# Patient Record
Sex: FEMALE
[De-identification: names, ages, dates, MRNs, and addresses within clinical notes are randomized per-mention and may not be internally consistent; named-entity substitution may affect disease eponyms.]

---

## 2019-04-04 ENCOUNTER — HOSPITAL ENCOUNTER (OUTPATIENT)
Dept: HOSPITAL 31 - C.3T | Age: 74
Setting detail: OBSERVATION
LOS: 1 days | Discharge: HOME | End: 2019-04-05
Attending: OTOLARYNGOLOGY | Admitting: OTOLARYNGOLOGY
Payer: MEDICARE

## 2019-04-04 VITALS — RESPIRATION RATE: 20 BRPM

## 2019-04-04 DIAGNOSIS — I10: ICD-10-CM

## 2019-04-04 DIAGNOSIS — T17.208A: Primary | ICD-10-CM

## 2019-04-04 DIAGNOSIS — E78.5: ICD-10-CM

## 2019-04-04 DIAGNOSIS — E78.00: ICD-10-CM

## 2019-04-04 LAB
ALBUMIN SERPL-MCNC: 4.1 G/DL (ref 3.5–5)
ALBUMIN/GLOB SERPL: 1.7 {RATIO} (ref 1–2.1)
ALT SERPL-CCNC: 39 U/L (ref 9–52)
APTT BLD: 30 SECONDS (ref 21–34)
AST SERPL-CCNC: 46 U/L (ref 14–36)
BASOPHILS # BLD AUTO: 0 K/UL (ref 0–0.2)
BASOPHILS NFR BLD: 0.3 % (ref 0–2)
BUN SERPL-MCNC: 17 MG/DL (ref 7–17)
CALCIUM SERPL-MCNC: 9.3 MG/DL (ref 8.6–10.4)
EOSINOPHIL # BLD AUTO: 0 K/UL (ref 0–0.7)
EOSINOPHIL NFR BLD: 0.7 % (ref 0–4)
ERYTHROCYTE [DISTWIDTH] IN BLOOD BY AUTOMATED COUNT: 12.7 % (ref 11.5–14.5)
GFR NON-AFRICAN AMERICAN: > 60
HGB BLD-MCNC: 13.1 G/DL (ref 11–16)
INR PPP: 1.1
LYMPHOCYTES # BLD AUTO: 1.3 K/UL (ref 1–4.3)
LYMPHOCYTES NFR BLD AUTO: 27 % (ref 20–40)
MCH RBC QN AUTO: 33.4 PG (ref 27–31)
MCHC RBC AUTO-ENTMCNC: 33.2 G/DL (ref 33–37)
MCV RBC AUTO: 100.9 FL (ref 81–99)
MONOCYTES # BLD: 0.4 K/UL (ref 0–0.8)
MONOCYTES NFR BLD: 8.5 % (ref 0–10)
NEUTROPHILS # BLD: 3.1 K/UL (ref 1.8–7)
NEUTROPHILS NFR BLD AUTO: 63.5 % (ref 50–75)
NRBC BLD AUTO-RTO: 0.1 % (ref 0–2)
PLATELET # BLD: 171 K/UL (ref 130–400)
PMV BLD AUTO: 8.7 FL (ref 7.2–11.7)
PROTHROMBIN TIME: 11.8 SECONDS (ref 9.7–12.2)
RBC # BLD AUTO: 3.91 MIL/UL (ref 3.8–5.2)
WBC # BLD AUTO: 4.8 K/UL (ref 4.8–10.8)

## 2019-04-04 PROCEDURE — 71045 X-RAY EXAM CHEST 1 VIEW: CPT

## 2019-04-04 PROCEDURE — 36415 COLL VENOUS BLD VENIPUNCTURE: CPT

## 2019-04-04 PROCEDURE — 80053 COMPREHEN METABOLIC PANEL: CPT

## 2019-04-04 PROCEDURE — 86850 RBC ANTIBODY SCREEN: CPT

## 2019-04-04 PROCEDURE — 99285 EMERGENCY DEPT VISIT HI MDM: CPT

## 2019-04-04 PROCEDURE — 85610 PROTHROMBIN TIME: CPT

## 2019-04-04 PROCEDURE — 85025 COMPLETE CBC W/AUTO DIFF WBC: CPT

## 2019-04-04 PROCEDURE — 86900 BLOOD TYPING SEROLOGIC ABO: CPT

## 2019-04-04 PROCEDURE — 85730 THROMBOPLASTIN TIME PARTIAL: CPT

## 2019-04-04 PROCEDURE — 70490 CT SOFT TISSUE NECK W/O DYE: CPT

## 2019-04-04 PROCEDURE — 31530 LARYNGOSCOPY W/FB REMOVAL: CPT

## 2019-04-04 NOTE — C.PDOC
History Of Present Illness





Patient presents to ED c/o foreign body sensation in her throat since eating 

fish at 2pm today.  She thinks she may have a fish bone stuck in her throat.  

She denies SOB, cough, chest pain, fever.





PMHx: HTN, hyperlipidemia, gastritis, arthritis


Time Seen by Provider: 04/04/19 16:24


Chief Complaint (Nursing): ENT Problem


History Per: Patient


History/Exam Limitations: no limitations


Current Symptoms Are (Timing): Still Present


Quality Of Discomfort: "Pain" (foreign body sensation)


Severity: Moderate





Past Medical History


Reviewed: Historical Data, Nursing Documentation, Vital Signs


Vital Signs: 





                                Last Vital Signs











Temp  98.3 F   04/04/19 16:13


 


Pulse  76   04/04/19 16:13


 


Resp  16   04/04/19 16:13


 


BP  110/53 L  04/04/19 16:13


 


Pulse Ox  97   04/04/19 16:13














- Medical History


PMH: Arthritis, Gastritis, HTN, Hypercholesterolemia


Surgical History: Endoscopy





- [facility] Procedures











CYSTOSCOPY NEC (08/12/15)


TRANSURETH BLADD BIOPSY (08/12/15)








Family History: States: No Known Family Hx





- Social History


Hx Alcohol Use: No


Hx Substance Use: No





- Immunization History


Hx Tetanus Toxoid Vaccination: No


Hx Influenza Vaccination: No





Review Of Systems


Constitutional: Negative for: Fever, Chills


ENT: Positive for: Throat Pain (FB sensation).  Negative for: Nose Congestion


Cardiovascular: Negative for: Chest Pain, Palpitations


Respiratory: Negative for: Cough, Shortness of Breath


Gastrointestinal: Negative for: Nausea, Vomiting, Abdominal Pain


Skin: Negative for: Rash





Physical Exam





- Physical Exam


Appears: Well, Non-toxic, No Acute Distress, Other (speaking in full sentences)


Head: Normacephalic


Eye(s): bilateral: Normal Inspection


Nose: Normal


Oral Mucosa: Moist


Tongue: Normal Appearing, No Swelling


Lips: Normal Appearing, No Swelling


Throat: Normal, No Erythema, No Exudate, No Drooling, Other (uvula midline and 

normal in appearance )


Lymphatic: No Adenopathy (no cervical adenopathy)


Chest: Symmetrical, No Subcutaneous Emphysema


Cardiovascular: Rhythm Regular


Respiratory: Normal Breath Sounds, No Rales, No Rhonchi, No Wheezing


Gastrointestinal/Abdominal: Normal Exam, Bowel Sounds, Soft, No Tenderness


Neurological/Psych: Oriented x3





ED Course And Treatment


O2 Sat by Pulse Oximetry: 97 (ra)


Pulse Ox Interpretation: Normal





- Radiology


CXR: Interpreted by Me, Viewed By Me


CXR Interpretation: Yes: No Acute Disease.  No: Infiltrates





- CT Scan/US


  ** CT SOFT TISSUE NECK 


Other Rad Studies (CT/US): Read By Radiologist, Radiology Report Reviewed


CT/US Interpretation: Accession No. : [id number].  Patient Name / ID : 

DAMON PICKENS  / [id number].  Exam Date : 04/04/2019 17:27:48 ( Approved ).  

Study Comment :  Sex / Age : F  / 73Y.  Creator : Thuy Moncada.  Dictator : 

Edita Huang MD.   :  Approver : Edita Huang MD.  

Approver2 :  Report Date : 04/04/2019 17:33:47.  My Comment :  

*******************

****************************************************************.  Date of 

service:  04/04/2019.  PROCEDURE:  CT NECK WITHOUT  CONTRAST.  HISTORY:  fish 

bone/FB stuck in throat.  COMPARISON:  None available.  TECHNIQUE:  CT of the 

neck without intravenous contrast. Coronal and sagittal reformats generated.  

Radiation dose:  Total exam DLP = 234.72 mGy-cm.  This CT exam was performed 

using one or more of the following dose reduction techniques: Automated exposure

control, adjustment of the mA and/or kV according to patient size, and/or use of

iterative reconstruction technique.  FINDINGS:  NASOPHARYNX:  Within normal 

limits.  SUPRAHYOID NECK:  There is a long linear at least 3.5 cm radiopaque 

foreign body in the right piriform sinus extending into the right parapharyngeal

space.  The oropharynx, oral cavity, parapharyngeal space and retropharyngeal 

space are grossly normal.  INFRAHYOID NECK:  Unremarkable larynx, hypopharynx, 

and supraglottic space. Vocal cords intact.  MASS:  None.  GLANDS:  Parotid and 

submandibular glands unremarkable. Normal size thyroid gland, without nodule.  

LYMPH NODES:  Normal. No lymphadenopathy.  CERVICAL SPINE:  No fracture or focal

lesion. Mild multilevel degenerative disc disease.  OTHER FINDINGS:  None.  

IMPRESSION:  Findings are consistent with at least 3.5 cm fish bone in the right

piriform sinus extending into the right parapharyngeal space.


Progress Note: Blood work, CT soft tissue neck ordered and reviewed.  6:20pm- 

Call placed to Dr. Behin, mailbox full.  6:35pm- Spoke with Dr. Behin, will take

patient to OR tonight.





Disposition





- Disposition

## 2019-04-04 NOTE — CT
Date of service: 



04/04/2019



PROCEDURE:  CT NECK WITHOUT  CONTRAST



HISTORY:

fish bone/FB stuck in throat



COMPARISON:

None available.



TECHNIQUE:

CT of the neck without intravenous contrast. Coronal and sagittal 

reformats generated. 



Radiation dose:



Total exam DLP = 234.72 mGy-cm.



This CT exam was performed using one or more of the following dose 

reduction techniques: Automated exposure control, adjustment of the 

mA and/or kV according to patient size, and/or use of iterative 

reconstruction technique.



FINDINGS:



NASOPHARYNX:

Within normal limits.



SUPRAHYOID NECK:

There is a long linear at least 3.5 cm radiopaque foreign body in the 

right piriform sinus extending into the right parapharyngeal space.  

The oropharynx, oral cavity, parapharyngeal space and retropharyngeal 

space are grossly normal.



INFRAHYOID NECK:

Unremarkable larynx, hypopharynx, and supraglottic space. Vocal cords 

intact.



MASS:

None.



GLANDS:

Parotid and submandibular glands unremarkable. Normal size thyroid 

gland, without nodule.



LYMPH NODES:

Normal. No lymphadenopathy.



CERVICAL SPINE:

No fracture or focal lesion. Mild multilevel degenerative disc 

disease.



OTHER FINDINGS:

None.



IMPRESSION:

Findings are consistent with at least 3.5 cm fish bone in the right 

piriform sinus extending into the right parapharyngeal space.

## 2019-04-04 NOTE — RAD
HISTORY:

 preop 



COMPARISON:

Chest x-ray performed 10/31/18 



TECHNIQUE:

Chest, one view.



FINDINGS:





LUNGS:

Hyperinflation may be seen in the setting of COPD.  Emphysematous 

changes.  No focal consolidation.



Please note that chest x-ray has limited sensitivity for the 

detection of pulmonary masses.



PLEURA:

No significant pleural effusion identified. No definite pneumothorax .



CARDIOVASCULAR:

Heart size appears within normal limits.  Mild atherosclerotic 

calcification present. 



OSSEOUS STRUCTURES:

Degenerative changes of the spine.



VISUALIZED UPPER ABDOMEN:

Unremarkable.



OTHER FINDINGS:

None.



IMPRESSION:

COPD/emphysema.  No focal consolidation.

## 2019-04-05 VITALS
DIASTOLIC BLOOD PRESSURE: 69 MMHG | SYSTOLIC BLOOD PRESSURE: 116 MMHG | HEART RATE: 78 BPM | TEMPERATURE: 98.3 F | OXYGEN SATURATION: 97 %

## 2019-04-05 NOTE — CP.PCM.PN
Subjective





- Date & Time of Evaluation


Date of Evaluation: 04/05/19


Time of Evaluation: 09:43





- Subjective


Subjective: 





mild throat pain


ok po


d/c home





Objective





- Vital Signs/Intake and Output


Vital Signs (last 24 hours): 


                                        











Temp Pulse Resp BP Pulse Ox


 


 98.3 F   78   20   116/69   97 


 


 04/05/19 07:00  04/05/19 07:00  04/05/19 07:00  04/05/19 07:00  04/05/19 07:00








Intake and Output: 


                                        











 04/05/19 04/05/19





 06:59 18:59


 


Intake Total 600 


 


Balance 600 














- Labs


Labs: 


                                        





                                 04/04/19 18:53 





                                 04/04/19 18:53 





                                        











PT  11.8 SECONDS (9.7-12.2)   04/04/19  18:53    


 


INR  1.1   04/04/19  18:53    


 


APTT  30 SECONDS (21-34)   04/04/19  18:53

## 2019-04-05 NOTE — OP
PROCEDURE DATE:  04/04/2019



PREOPERATIVE DIAGNOSIS:  Foreign body in the throat.



POSTOPERATIVE DIAGNOSIS:  Foreign body in the throat.



PROCEDURE:  Direct laryngoscopy with removal of foreign body.



SIGNIFICANT FINDINGS:  Foreign body in the throat on the right.



DESCRIPTION OF PROCEDURE:  The patient was brought into the room, placed in

supine position.  Anesthesia was initiated through face mask.  I took a

look with Mac blade and was able to visualize the fish bone; however,

it was too anterior to remove with graspers.  Therefore, the patient was

intubated.  Shoulder roll was placed.  Neck extended.  The patient was

draped in the usual manner.  Direct laryngoscope was inserted into the

cavity after tooth guard was placed over the upper teeth to protect them. 

Direct laryngoscope was passed through the oropharynx and hypopharynx.  The

pharyngeal walls, base of tongue, vallecula, epiglottis, AE folds, false

cords, true cords, pyriform sinuses were brought into view.  Foreign body

was also in the right aspect of the hypopharynx, and graspers were used to

grab the foreign body and remove it.  Bleeding was controlled with time. 

Direct laryngoscope was removed.  The patient was taken off the anesthesia and 

taken to the recovery room in stable manner.







__________________________________________

Babak Behin, MD





DD:  04/04/2019 22:37:26

DT:  04/04/2019 23:07:35

Job # [id number]

[id number]

## 2019-04-08 NOTE — CARD
--------------- APPROVED REPORT --------------





Date of service: 04/04/2019



EKG Measurement

Heart [id number]

ND [id number]

[id number]

[id number]

[id number]



<Conclusion>

Normal sinus rhythm

Normal ECG